# Patient Record
Sex: FEMALE | Race: OTHER | HISPANIC OR LATINO
[De-identification: names, ages, dates, MRNs, and addresses within clinical notes are randomized per-mention and may not be internally consistent; named-entity substitution may affect disease eponyms.]

---

## 2017-03-22 ENCOUNTER — RECORD ABSTRACTING (OUTPATIENT)
Age: 37
End: 2017-03-22

## 2017-03-22 DIAGNOSIS — F17.200 NICOTINE DEPENDENCE, UNSPECIFIED, UNCOMPLICATED: ICD-10-CM

## 2017-03-22 DIAGNOSIS — Z78.9 OTHER SPECIFIED HEALTH STATUS: ICD-10-CM

## 2017-03-22 PROBLEM — Z00.00 ENCOUNTER FOR PREVENTIVE HEALTH EXAMINATION: Status: ACTIVE | Noted: 2017-03-22

## 2018-10-26 ENCOUNTER — INPATIENT (INPATIENT)
Facility: HOSPITAL | Age: 38
LOS: 3 days | Discharge: HOME | End: 2018-10-30
Attending: PSYCHIATRY & NEUROLOGY | Admitting: PSYCHIATRY & NEUROLOGY

## 2018-10-26 VITALS
RESPIRATION RATE: 20 BRPM | DIASTOLIC BLOOD PRESSURE: 75 MMHG | HEART RATE: 98 BPM | TEMPERATURE: 97 F | OXYGEN SATURATION: 99 % | SYSTOLIC BLOOD PRESSURE: 127 MMHG

## 2018-10-26 DIAGNOSIS — F39 UNSPECIFIED MOOD [AFFECTIVE] DISORDER: ICD-10-CM

## 2018-10-26 LAB
ALBUMIN SERPL ELPH-MCNC: 4.5 G/DL — SIGNIFICANT CHANGE UP (ref 3.5–5.2)
ALP SERPL-CCNC: 62 U/L — SIGNIFICANT CHANGE UP (ref 30–115)
ALT FLD-CCNC: 29 U/L — SIGNIFICANT CHANGE UP (ref 0–41)
AMPHET UR-MCNC: NEGATIVE — SIGNIFICANT CHANGE UP
ANION GAP SERPL CALC-SCNC: 19 MMOL/L — HIGH (ref 7–14)
APAP SERPL-MCNC: <5 UG/ML — LOW (ref 10–30)
APPEARANCE UR: ABNORMAL
AST SERPL-CCNC: 24 U/L — SIGNIFICANT CHANGE UP (ref 0–41)
BACTERIA # UR AUTO: ABNORMAL /HPF
BARBITURATES UR SCN-MCNC: NEGATIVE — SIGNIFICANT CHANGE UP
BENZODIAZ UR-MCNC: NEGATIVE — SIGNIFICANT CHANGE UP
BILIRUB SERPL-MCNC: 0.5 MG/DL — SIGNIFICANT CHANGE UP (ref 0.2–1.2)
BILIRUB UR-MCNC: ABNORMAL
BUN SERPL-MCNC: 10 MG/DL — SIGNIFICANT CHANGE UP (ref 10–20)
CALCIUM SERPL-MCNC: 9.5 MG/DL — SIGNIFICANT CHANGE UP (ref 8.5–10.1)
CHLORIDE SERPL-SCNC: 99 MMOL/L — SIGNIFICANT CHANGE UP (ref 98–110)
CO2 SERPL-SCNC: 23 MMOL/L — SIGNIFICANT CHANGE UP (ref 17–32)
COCAINE METAB.OTHER UR-MCNC: POSITIVE
COLOR SPEC: SIGNIFICANT CHANGE UP
COMMENT - URINE: SIGNIFICANT CHANGE UP
CREAT SERPL-MCNC: 0.9 MG/DL — SIGNIFICANT CHANGE UP (ref 0.7–1.5)
DIFF PNL FLD: ABNORMAL
DRUG SCREEN 1, URINE RESULT: SIGNIFICANT CHANGE UP
EPI CELLS # UR: ABNORMAL /HPF
GLUCOSE SERPL-MCNC: 97 MG/DL — SIGNIFICANT CHANGE UP (ref 70–99)
GLUCOSE UR QL: NEGATIVE — SIGNIFICANT CHANGE UP
HCT VFR BLD CALC: 37.8 % — SIGNIFICANT CHANGE UP (ref 37–47)
HGB BLD-MCNC: 13.1 G/DL — SIGNIFICANT CHANGE UP (ref 12–16)
HYALINE CASTS # UR AUTO: ABNORMAL /LPF
KETONES UR-MCNC: 15
LEUKOCYTE ESTERASE UR-ACNC: ABNORMAL
LIDOCAIN IGE QN: 25 U/L — SIGNIFICANT CHANGE UP (ref 7–60)
MCHC RBC-ENTMCNC: 29.8 PG — SIGNIFICANT CHANGE UP (ref 27–31)
MCHC RBC-ENTMCNC: 34.7 G/DL — SIGNIFICANT CHANGE UP (ref 32–37)
MCV RBC AUTO: 85.9 FL — SIGNIFICANT CHANGE UP (ref 81–99)
METHADONE UR-MCNC: NEGATIVE — SIGNIFICANT CHANGE UP
NITRITE UR-MCNC: NEGATIVE — SIGNIFICANT CHANGE UP
NRBC # BLD: 0 /100 WBCS — SIGNIFICANT CHANGE UP (ref 0–0)
OPIATES UR-MCNC: NEGATIVE — SIGNIFICANT CHANGE UP
PCP UR-MCNC: NEGATIVE — SIGNIFICANT CHANGE UP
PH UR: 5.5 — SIGNIFICANT CHANGE UP (ref 5–8)
PLATELET # BLD AUTO: 223 K/UL — SIGNIFICANT CHANGE UP (ref 130–400)
POTASSIUM SERPL-MCNC: 3.3 MMOL/L — LOW (ref 3.5–5)
POTASSIUM SERPL-SCNC: 3.3 MMOL/L — LOW (ref 3.5–5)
PROPOXYPHENE QUALITATIVE URINE RESULT: NEGATIVE — SIGNIFICANT CHANGE UP
PROT SERPL-MCNC: 8.5 G/DL — HIGH (ref 6–8)
PROT UR-MCNC: 30
RBC # BLD: 4.4 M/UL — SIGNIFICANT CHANGE UP (ref 4.2–5.4)
RBC # FLD: 12.8 % — SIGNIFICANT CHANGE UP (ref 11.5–14.5)
RBC CASTS # UR COMP ASSIST: SIGNIFICANT CHANGE UP /HPF
SALICYLATES SERPL-MCNC: <0.3 MG/DL — LOW (ref 4–30)
SODIUM SERPL-SCNC: 141 MMOL/L — SIGNIFICANT CHANGE UP (ref 135–146)
SP GR SPEC: >=1.03 — SIGNIFICANT CHANGE UP (ref 1.01–1.03)
THC UR QL: NEGATIVE — SIGNIFICANT CHANGE UP
UROBILINOGEN FLD QL: 1 (ref 0.2–0.2)
WBC # BLD: 14.04 K/UL — HIGH (ref 4.8–10.8)
WBC # FLD AUTO: 14.04 K/UL — HIGH (ref 4.8–10.8)
WBC UR QL: SIGNIFICANT CHANGE UP /HPF

## 2018-10-26 RX ORDER — SODIUM CHLORIDE 9 MG/ML
1000 INJECTION INTRAMUSCULAR; INTRAVENOUS; SUBCUTANEOUS
Qty: 0 | Refills: 0 | Status: DISCONTINUED | OUTPATIENT
Start: 2018-10-26 | End: 2018-10-30

## 2018-10-26 RX ORDER — TETANUS TOXOID, REDUCED DIPHTHERIA TOXOID AND ACELLULAR PERTUSSIS VACCINE, ADSORBED 5; 2.5; 8; 8; 2.5 [IU]/.5ML; [IU]/.5ML; UG/.5ML; UG/.5ML; UG/.5ML
0.5 SUSPENSION INTRAMUSCULAR ONCE
Qty: 0 | Refills: 0 | Status: COMPLETED | OUTPATIENT
Start: 2018-10-26 | End: 2018-10-26

## 2018-10-26 RX ORDER — POTASSIUM CHLORIDE 20 MEQ
40 PACKET (EA) ORAL ONCE
Qty: 0 | Refills: 0 | Status: COMPLETED | OUTPATIENT
Start: 2018-10-26 | End: 2018-10-26

## 2018-10-26 RX ORDER — OXYCODONE AND ACETAMINOPHEN 5; 325 MG/1; MG/1
1 TABLET ORAL ONCE
Qty: 0 | Refills: 0 | Status: DISCONTINUED | OUTPATIENT
Start: 2018-10-26 | End: 2018-10-26

## 2018-10-26 RX ORDER — DIPHENHYDRAMINE HCL 50 MG
50 CAPSULE ORAL ONCE
Qty: 0 | Refills: 0 | Status: DISCONTINUED | OUTPATIENT
Start: 2018-10-26 | End: 2018-10-29

## 2018-10-26 RX ADMIN — SODIUM CHLORIDE 125 MILLILITER(S): 9 INJECTION INTRAMUSCULAR; INTRAVENOUS; SUBCUTANEOUS at 14:17

## 2018-10-26 RX ADMIN — OXYCODONE AND ACETAMINOPHEN 1 TABLET(S): 5; 325 TABLET ORAL at 14:18

## 2018-10-26 RX ADMIN — OXYCODONE AND ACETAMINOPHEN 1 TABLET(S): 5; 325 TABLET ORAL at 14:17

## 2018-10-26 RX ADMIN — Medication 40 MILLIEQUIVALENT(S): at 17:46

## 2018-10-26 RX ADMIN — TETANUS TOXOID, REDUCED DIPHTHERIA TOXOID AND ACELLULAR PERTUSSIS VACCINE, ADSORBED 0.5 MILLILITER(S): 5; 2.5; 8; 8; 2.5 SUSPENSION INTRAMUSCULAR at 14:17

## 2018-10-26 NOTE — ED PROVIDER NOTE - OBJECTIVE STATEMENT
28 yo F  c/o intermittent left mid back pain  x 3 days associated with nausea. Pain is worse with movement. Patient was seen in [facility] yesterday for allergic reaction but also fell after ED visit.  Pain increased since. No fever, abdominal pains, hematuria or dysuria. 26 yo F  c/o intermittent left mid back pain  x 3 days associated with nausea. Pain is worse with movement. Patient was seen in [facility] yesterday for allergic reaction but also fell after ED visit.  Pain increased since. No fever, abdominal pains, hematuria or dysuria. Patient currently on Bactrim but does not know why. 39yo F  c/o intermittent left mid back pain  x 3 days associated with nausea. Pain is worse with movement. Patient was seen in [facility] yesterday for allergic reaction but also fell after ED visit.  Pain increased since. No fever, abdominal pains, hematuria or dysuria. Patient currently on Bactrim but does not know why.

## 2018-10-26 NOTE — ED BEHAVIORAL HEALTH ASSESSMENT NOTE - DESCRIPTION
Irritable, but in behavioral control. Denied Patient reports employment, but is vague. She reports having 8 children, 2 live with her.  She reports they are all cared for.  She otherwise would not participate in providing a history.

## 2018-10-26 NOTE — ED ADULT NURSE REASSESSMENT NOTE - NS ED NURSE REASSESS COMMENT FT1
pt reassessed A/o times 4 Vs stable 1:1 sit at bed site remain comfortable denies no pain no SOB no N/V no depression or  suicide thought, no homicide thought pt admitted to [facility] report is given to N  waiting for transport .

## 2018-10-26 NOTE — ED PROVIDER NOTE - PHYSICAL EXAMINATION
Gen: Alert, NAD, well appearing  Head: NC, AT, PERRL, EOMI, normal lids/conjunctiva  ENT: normal hearing, patent oropharynx  Neck: +supple, no tenderness/meningismus,  Pulm: Bilateral BS, normal resp effort, no wheeze/stridor/retractions  CV: RRR, no murmer  Abd: soft, +left flank and left CVA tenderness. No abdominal tenderness  Mskel: no edema/erythema/cyanosis. +tender to palpation left mid back/left  CVA. No midline tenderness. SLR negative. Patient ambulatory in ED  Skin: +abrasion to mid back. no rash, warm/dry  Neuro: AAOx3, no sensory/motor deficits

## 2018-10-26 NOTE — ED PROVIDER NOTE - PROGRESS NOTE DETAILS
Patient c/o itchiness to hands. +redness noted to hands and bacK. WIll give Benadryl. Patient requesting to see psych for anxiety Per Dr. Dockery, admit to [facility] under Dr. Moya

## 2018-10-26 NOTE — ED BEHAVIORAL HEALTH ASSESSMENT NOTE - SUMMARY
38 year old woman domiciled with two children, reports that she is employed by [employer] with reported past psychiatric history of schizoaffective d/o and polysubstance disorder by collateral who requested to speak to a psychiatrist.  The patient's psychiatric history is unclear at this point as the patient has not been forthcoming with her history.  It appears that she has a chronic mental illness that has required treatment with antipsychotics and multiple past psychiatric hospitalizations.  AT this time she appears to be in distress and is requesting voluntary admission.  She has modifiable factors that would benefit from inpatient admission especially given that she is currently without outpatient treatment.

## 2018-10-26 NOTE — ED BEHAVIORAL HEALTH ASSESSMENT NOTE - OTHER PAST PSYCHIATRIC HISTORY (INCLUDE DETAILS REGARDING ONSET, COURSE OF ILLNESS, INPATIENT/OUTPATIENT TREATMENT)
The patient was vague and no prior behavioral health notes could be recovered from the chart search.  She has had multiple hospitalizations, probably at [facility].  She has also been treated as an outpatient at [facility].  She was contradictory regarding length of treatment and past medication trials.  She denied past suicide attempts and violence.

## 2018-10-26 NOTE — ED PROVIDER NOTE - ATTENDING CONTRIBUTION TO CARE
26yo woman h/o "anxiety and depression" c/o low back pain,mostly R sided, associated with L back pain x 3 days. Nausea but no vomiting, no urinary sx, no vag complaints, no pelvic pain. On exam, pt has a bizarre, blunted affect, does not make eye contact. RAH, EOMI, lungs CTA, CVs1S2 RRR abd soft, NT, mild L CVA tenderness. Skin noted to be flushed/erythematous, mostly over the torso but also cheeks and hands. Pt reported that she was seen at [email or web address] for allergic rxn yesterday. She also received percocet in the ED today and says she has not taken it before, does not know of any other allergies. During my exam, pt suddenly requested evaluation by psychiatrist because "I'm mentally not well, I cant take care of my babies." She denied SI/HI but said she has been admitted before for psych issues. Otherwise she is a poor historian. Given affect and complaints, labs/CT were done and unremarkable; pt was seen by psych and rec admission. 37yo woman h/o "anxiety and depression" c/o low back pain,mostly R sided, associated with L back pain x 3 days. Nausea but no vomiting, no urinary sx, no vag complaints, no pelvic pain. On exam, pt has a bizarre, blunted affect, does not make eye contact. RAH, EOMI, lungs CTA, CVs1S2 RRR abd soft, NT, mild L CVA tenderness. Skin noted to be flushed/erythematous, mostly over the torso but also cheeks and hands. Pt reported that she was seen at [facility] for allergic rxn yesterday. She also received percocet in the ED today and says she has not taken it before, does not know of any other allergies. During my exam, pt suddenly requested evaluation by psychiatrist because "I'm mentally not well, I cant take care of my babies." She denied SI/HI but said she has been admitted before for psych issues. Otherwise she is a poor historian. Given affect and complaints, labs/CT were done and unremarkable; pt was seen by psych and rec admission.

## 2018-10-26 NOTE — ED BEHAVIORAL HEALTH ASSESSMENT NOTE - RISK ASSESSMENT
Unable to participate in safety planning and a vague history to renders safety assessment limited at this time

## 2018-10-26 NOTE — ED ADULT TRIAGE NOTE - CHIEF COMPLAINT QUOTE
patient reports lower back pain x3 days denies urinary complaints patient. patient seen at [facility] this am for same complaint

## 2018-10-26 NOTE — ED PROVIDER NOTE - MEDICAL DECISION MAKING DETAILS
26yo woman h/o psych d/o ?? c/o back/flank pain, unremarkable workup with labs/CT; seen by psych at her request, pt admitted to [facility] 39yo woman h/o psych d/o ?? c/o back/flank pain, unremarkable workup with labs/CT; seen by psych at her request, pt admitted to [facility]

## 2018-10-26 NOTE — ED ADULT NURSE NOTE - NS TRANSFER PATIENT BELONGINGS
duffle bag with additional belongings, pocketbook (patient state she has no money)/Other belongings/Cell Phone/PDA (specify)/Clothing

## 2018-10-26 NOTE — ED ADULT NURSE NOTE - NSIMPLEMENTINTERV_GEN_ALL_ED
Implemented All Universal Safety Interventions:  [place] to call system. Call bell, personal items and telephone within reach. Instruct patient to call for assistance. Room bathroom lighting operational. Non-slip footwear when patient is off stretcher. Physically safe environment: no spills, clutter or unnecessary equipment. Stretcher in lowest position, wheels locked, appropriate side rails in place.

## 2018-10-26 NOTE — ED BEHAVIORAL HEALTH ASSESSMENT NOTE - SUBSTANCE USE
need for outpatient follow-up/return to ED if symptoms worsen, persist or questions arise/radiology results/lab results None known

## 2018-10-26 NOTE — ED BEHAVIORAL HEALTH ASSESSMENT NOTE - HPI (INCLUDE ILLNESS QUALITY, SEVERITY, DURATION, TIMING, CONTEXT, MODIFYING FACTORS, ASSOCIATED SIGNS AND SYMPTOMS)
38 year old woman domiciled with two children, reports that she is employed by [employer] with reported past psychiatric history of "anxiety" who requested to speak to a psychiatrist.    The patient was a vague, at times confrontational, and unreliable historian.  She was unable to answer questions completely and appeared tired on assessment.  She reported wanting admission to psychiatry because she is overwhelmed.  She reported experiencing dysphoria and generalized anxiety with frequent panic attacks "every 2 hours."  When attempting to discuss triggers, she would only repeat that "my mother messes everything up" stating that she was persecuted by her mother, but could not elaborate further.  She was otherwise not very forthcoming with history though it appears she is out of psychiatric treatment for approximately 6 months and had previously been treated at [facility] outpatient program.  She reported a previous history of treatment with risperidone and denied all other medications.  But when asked again she admitted there were probably many more she could not recall.  She also initially reported one past hospitalization as a child "because my mom is crazy," but then later reported multiple past hospitalizations with the last one being about 6 months ago.  She would not elaborate further on symptoms and past diagnoses though she did reply in the affirmative that she can experience auditory hallucinations.  She denied current SI and HI.  However, she could not safety plan nor could she provide clear answers to what she would do if discharged from the ED with outpatient referrals.    As per [facility] collateral, the patient is diagnosed with schizoaffective disorder, polysubstance use disorder, and borderline personality disorder.  The attending was unable to provide more information regarding the patient's history other than she was last admitted in June 2018 and treated with risperidone 3 mg PO BID

## 2018-10-26 NOTE — ED BEHAVIORAL HEALTH ASSESSMENT NOTE - DETAILS
back pain being treated in the ED reports a rash being treated in the ED with diphenhydramine two children at home both over age 18 none Patient unwilling documentation self referred

## 2018-10-26 NOTE — ED ADULT NURSE NOTE - CHIEF COMPLAINT QUOTE
patient reports lower back pain x3 days denies urinary complaints patient. patient seen at [facility] this am for same complaint

## 2018-10-26 NOTE — ED PROVIDER NOTE - NS ED ROS FT
Review of Systems    Constitutional: (-) fever  Eyes/ENT: (-) blurry vision, (-) epistaxis  Cardiovascular: (-) chest pain, (-) syncope  Respiratory: (-) cough, (-) shortness of breath  Gastrointestinal: (+) nausea, (-) vomiting, (-) diarrhea  Musculoskeletal: (-) neck pain, (+) back pain, (-) joint pain  Integumentary: (-) rash, (-) edema  Neurological: (-) headache, (-) altered mental status  Psychiatric: (-) hallucinations  Allergic/Immunologic: (-) pruritus

## 2018-10-27 LAB
CULTURE RESULTS: SIGNIFICANT CHANGE UP
SPECIMEN SOURCE: SIGNIFICANT CHANGE UP

## 2018-10-27 RX ORDER — ACETAMINOPHEN 500 MG
650 TABLET ORAL EVERY 6 HOURS
Qty: 0 | Refills: 0 | Status: DISCONTINUED | OUTPATIENT
Start: 2018-10-27 | End: 2018-10-30

## 2018-10-27 RX ORDER — RISPERIDONE 4 MG/1
1 TABLET ORAL EVERY 6 HOURS
Qty: 0 | Refills: 0 | Status: DISCONTINUED | OUTPATIENT
Start: 2018-10-27 | End: 2018-10-30

## 2018-10-27 RX ORDER — DIPHENHYDRAMINE HCL 50 MG
25 CAPSULE ORAL EVERY 6 HOURS
Qty: 0 | Refills: 0 | Status: DISCONTINUED | OUTPATIENT
Start: 2018-10-27 | End: 2018-10-28

## 2018-10-27 RX ORDER — HYDROCORTISONE 1 %
1 OINTMENT (GRAM) TOPICAL
Qty: 0 | Refills: 0 | Status: DISCONTINUED | OUTPATIENT
Start: 2018-10-27 | End: 2018-10-30

## 2018-10-27 RX ADMIN — Medication 1 APPLICATION(S): at 20:32

## 2018-10-27 RX ADMIN — Medication 25 MILLIGRAM(S): at 20:32

## 2018-10-27 RX ADMIN — Medication 1 TABLET(S): at 20:32

## 2018-10-27 RX ADMIN — Medication 1 APPLICATION(S): at 09:26

## 2018-10-27 NOTE — H&P ADULT - NSHPPHYSICALEXAM_GEN_ALL_CORE
GENERAL:  39y/o Female NAD, resting comfortably.  HEAD:  Atraumatic, Normocephalic  EYES: EOMI, PERRLA, conjunctiva and sclera clear  NECK: Supple, No JVD, no cervical lymphadenopathy, non-tender  CHEST/LUNG: Clear to auscultation bilaterally; No wheeze, rhonchi, or rales  HEART: Regular rate and rhythm; S1&S2  ABDOMEN: Soft, Nontender, Nondistended x 4 quadrants; Bowel sounds present  EXTREMITIES:   Peripheral Pulses Present, No clubbing, no cyanosis, or no edema, no calf tenderness  PSYCH: AAOx3, cooperative, appropriate  NEUROLOGY: WNL  SKIN: WNL

## 2018-10-27 NOTE — H&P ADULT - HISTORY OF PRESENT ILLNESS
she was unable to answer questions completely and appeared tired on assessment.  She reported wanting admission to psychiatry because she is overwhelmed.  She reported experiencing dysphoria and generalized anxiety with frequent panic attacks "every 2 hours."  When attempting to discuss triggers, she would only repeat that "my mother messes everything up" stating that she was persecuted by her mother, but could not elaborate further.  She was otherwise not very forthcoming with history though it appears she is out of psychiatric treatment for approximately 6 months and had previously been treated at [facility] outpatient program.  She reported a previous history of treatment with risperidone and denied all other medications.  But when asked again she admitted there were probably many more she could not recall.  She also initially reported one past hospitalization as a child "because my mom is crazy," but then later reported multiple past hospitalizations with the last one being about 6 months ago.  She would not elaborate further on symptoms and past diagnoses though she did reply in the affirmative that she can experience auditory hallucinations.  She denied current SI and HI.  However, she could not safety plan nor could she provide clear answers to what she would do if discharged from the ED with outpatient referrals.    As per [facility] collateral, the patient is diagnosed with schizoaffective disorder, polysubstance use disorder, and borderline personality disorder. 37 y/o female unable to  answer questions completely and appeared tired on assessment.  She reported wanting admission to psychiatry because she is overwhelmed.  She reported experiencing dysphoria and generalized anxiety with frequent panic attacks "every 2 hours."  When attempting to discuss triggers, she would only repeat that "my mother messes everything up" stating that she was persecuted by her mother, but could not elaborate further.  She was otherwise not very forthcoming with history though it appears she is out of psychiatric treatment for approximately 6 months and had previously been treated at [facility] outpatient program.  She reported a previous history of treatment with risperidone and denied all other medications.  But when asked again she admitted there were probably many more she could not recall.  She also initially reported one past hospitalization as a child "because my mom is crazy," but then later reported multiple past hospitalizations with the last one being about 6 months ago.  She would not elaborate further on symptoms and past diagnoses though she did reply in the affirmative that she can experience auditory hallucinations.  She denied current SI and HI.  However, she could not safety plan nor could she provide clear answers to what she would do if discharged from the ED with outpatient referrals.    As per [facility] collateral, the patient is diagnosed with schizoaffective disorder, polysubstance use disorder, and borderline personality disorder.

## 2018-10-27 NOTE — H&P ADULT - NSHPLABSRESULTS_GEN_ALL_CORE
13.1   14.04 )-----------( 223      ( 26 Oct 2018 13:40 )             37.8       10-26    141  |  99  |  10  ----------------------------<  97  3.3<L>   |  23  |  0.9    Ca    9.5      26 Oct 2018 13:40    TPro  8.5<H>  /  Alb  4.5  /  TBili  0.5  /  DBili  x   /  AST  24  /  ALT  29  /  AlkPhos  62  10-26              Urinalysis Basic - ( 26 Oct 2018 13:40 )    Color: Dark Yellow / Appearance: Cloudy / SG: >=1.030 / pH: x  Gluc: x / Ketone: 15  / Bili: Moderate / Urobili: 1.0   Blood: x / Protein: 30 / Nitrite: Negative   Leuk Esterase: Trace / RBC: 1-2 /HPF / WBC 1-2 /HPF   Sq Epi: x / Non Sq Epi: Moderate /HPF / Bacteria: Few /HPF            Lactate Trend            CAPILLARY BLOOD GLUCOSE

## 2018-10-27 NOTE — BEHAVIORAL HEALTH ASSESSMENT NOTE - HPI (INCLUDE ILLNESS QUALITY, SEVERITY, DURATION, TIMING, CONTEXT, MODIFYING FACTORS, ASSOCIATED SIGNS AND SYMPTOMS)
Pt. stated she is not taking Risperdal. She will take Bactrium  She denied suicidal or homicidal ideation. She resting in her bed.

## 2018-10-27 NOTE — H&P ADULT - ASSESSMENT
46 y/o female experiencing dysphoria and generalized anxiety with frequent panic attacks "every 2 hours."  When attempting to discuss triggers, she would only repeat that "my mother messes everything up" stating that she was persecuted by her mother, but could not elaborate further.  She was otherwise not very forthcoming with history though it appears she is out of psychiatric treatment for approximately 6 months and had previously been treated at [facility]

## 2018-10-28 DIAGNOSIS — R21 RASH AND OTHER NONSPECIFIC SKIN ERUPTION: ICD-10-CM

## 2018-10-28 RX ORDER — HYDROXYZINE HCL 10 MG
50 TABLET ORAL EVERY 6 HOURS
Qty: 0 | Refills: 0 | Status: DISCONTINUED | OUTPATIENT
Start: 2018-10-28 | End: 2018-10-30

## 2018-10-28 RX ADMIN — Medication 1 TABLET(S): at 08:35

## 2018-10-28 RX ADMIN — Medication 1 APPLICATION(S): at 08:35

## 2018-10-28 RX ADMIN — RISPERIDONE 1 MILLIGRAM(S): 4 TABLET ORAL at 12:50

## 2018-10-28 RX ADMIN — Medication 25 MILLIGRAM(S): at 13:11

## 2018-10-28 RX ADMIN — Medication 1 APPLICATION(S): at 20:33

## 2018-10-28 RX ADMIN — Medication 1 TABLET(S): at 20:33

## 2018-10-28 RX ADMIN — Medication 50 MILLIGRAM(S): at 17:16

## 2018-10-28 NOTE — CONSULT NOTE ADULT - PROBLEM SELECTOR RECOMMENDATION 2
atyopical loacalized rash ledft flank  on hydro corstisone for it   if worsenes sha need derm eval   wbc ntoed  also on basctirm  poosible resiolving uti
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
## 2018-10-29 DIAGNOSIS — F14.90 COCAINE USE, UNSPECIFIED, UNCOMPLICATED: ICD-10-CM

## 2018-10-29 RX ORDER — RISPERIDONE 4 MG/1
1 TABLET ORAL
Qty: 0 | Refills: 0 | Status: DISCONTINUED | OUTPATIENT
Start: 2018-10-29 | End: 2018-10-30

## 2018-10-29 RX ORDER — BENZOCAINE AND MENTHOL 5; 1 G/100ML; G/100ML
1 LIQUID ORAL EVERY 4 HOURS
Qty: 0 | Refills: 0 | Status: DISCONTINUED | OUTPATIENT
Start: 2018-10-29 | End: 2018-10-30

## 2018-10-29 RX ADMIN — RISPERIDONE 1 MILLIGRAM(S): 4 TABLET ORAL at 20:19

## 2018-10-29 RX ADMIN — Medication 1 TABLET(S): at 08:23

## 2018-10-29 RX ADMIN — RISPERIDONE 1 MILLIGRAM(S): 4 TABLET ORAL at 16:19

## 2018-10-29 RX ADMIN — Medication 1 TABLET(S): at 20:19

## 2018-10-29 RX ADMIN — Medication 1 APPLICATION(S): at 08:23

## 2018-10-29 RX ADMIN — Medication 50 MILLIGRAM(S): at 16:08

## 2018-10-29 RX ADMIN — Medication 1 APPLICATION(S): at 20:19

## 2018-10-29 NOTE — CHART NOTE - NSCHARTNOTEFT_GEN_A_CORE
Patient informed psychiatrist that her child Aj Brown was to visit patient at [facility] on Tuesday 30 October. However, due to patient's admission to [facility], patient will be unable to do so. Patient provided contact number for [facility] [employer] ([phone]) to outreach  Sukh Travis (ext. [phone]) that the visit would not be able to happen.  provider contact information of foster care supervisor Holly (ext. [phone]).  informed SW that ACS visit will be rescheduled.

## 2018-10-29 NOTE — PROGRESS NOTE BEHAVIORAL HEALTH - NSBHCHARTREVIEWVS_PSY_A_CORE FT
T(C): 36.1 (10-29-18 @ 08:52), Max: 36.1 (10-29-18 @ 08:52)  HR: 86 (10-29-18 @ 08:52) (86 - 99)  BP: 90/50 (10-29-18 @ 08:52) (90/50 - 137/70)  RR: 17 (10-29-18 @ 08:52) (16 - 17)  SpO2: --

## 2018-10-29 NOTE — PROGRESS NOTE BEHAVIORAL HEALTH - NSBHFUPINTERVALHXFT_PSY_A_CORE
Pt was seen and evaluated in her room currently cooperative and states that she starting to feel less depressed but still with anxiety and believes her mother gets her agitated so she could be hospitalized.  She denies any suicidal or homicidal ideation at present and responsive to staff's verbal redirection.
Pt seen, chart reviewed. No acute events overnight.  Patient is alert, anxious, slightly agitated, cooperative,  Patient is in good behavioral control.  Pt presents no acute management problems.     ***Pt denies and presents no evidence for suicidal or homicidal ideas plans or intentions.    ***Pt slept well, and reports normal appetite and regular bowel movements. Pt was admitted as a voluntary pt an has submitted a 72 hours letter. Will continue to assess pt for ability to care for self and ascertain that she has a realistic discharge plan.

## 2018-10-29 NOTE — PROGRESS NOTE BEHAVIORAL HEALTH - NSBHFUPINTERVALCCFT_PSY_A_CORE
Pt was admitted due to agitation and paranoia.
"I wan t to b e discharged, I have a lot of things to do"
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
## 2018-10-29 NOTE — PROGRESS NOTE ADULT - SUBJECTIVE AND OBJECTIVE BOX
pt stable alert in NAD  no new complaints    MOOD DISORDER; FLANK PAIN  ^MOOD DISORDER; FLANK PAIN  No pertinent family history in first degree relatives  MEWS Score  Mood disorder  Rash in adult  Mood disorder  No significant past surgical history  BACK PAIN  90+  Flank pain    HEALTH ISSUES - PROBLEM Dx:  Rash in adult: Rash in adult  Mood disorder        PAST MEDICAL & SURGICAL HISTORY:  No significant past surgical history    No Known Allergies      FAMILY HISTORY:  No pertinent family history in first degree relatives      acetaminophen   Tablet .. 650 milliGRAM(s) Oral every 6 hours PRN  aluminum hydroxide/magnesium hydroxide/simethicone Suspension 30 milliLiter(s) Oral every 4 hours PRN  diphenhydrAMINE 50 milliGRAM(s) Oral once  hydrocortisone 0.5% Cream 1 Application(s) Topical two times a day  hydrOXYzine hydrochloride 50 milliGRAM(s) Oral every 6 hours PRN  risperiDONE   Tablet 1 milliGRAM(s) Oral every 6 hours PRN  sodium chloride 0.9%. 1000 milliLiter(s) IV Continuous <Continuous>  trimethoprim  160 mG/sulfamethoxazole 800 mG 1 Tablet(s) Oral two times a day      T(C): 35.7 (10-28-18 @ 18:45), Max: 36.4 (10-28-18 @ 10:09)  HR: 99 (10-28-18 @ 18:45) (71 - 99)  BP: 137/70 (10-28-18 @ 18:45) (110/62 - 137/70)  RR: 16 (10-28-18 @ 18:45) (16 - 18)  SpO2: --    PE;  general:   stable no acute changes  Lungs:    Heart:    EXT:    Neuro: no deficits   skon rash left flank unchanged      13.1  37.8  14.04  10  0.9  3.3  97                      CAPILLARY BLOOD GLUCOSE

## 2018-10-29 NOTE — PROGRESS NOTE BEHAVIORAL HEALTH - NSBHADMITIPOBSFT_PSY_A_CORE
Problem: Intellectual/Education/Knowledge Deficit  Intervention: Verbal/written education provided  Discuss port flush    Goal: Teaching initiated upon admission  Outcome: Met This Shift  Verbalized understanding of port flush    Problem: Discharge Planning  Intervention: Discharge to appropriate level of care  Discuss discharge instructions, follow ups and when to call doctor. Goal: Knowledge of discharge instructions  Knowledge of discharge instructions     Outcome: Met This Shift  Verbalized understanding of discharge instructions, follow ups and when to call doctor    Problem: Infection - Central Venous Catheter-Associated Bloodstream Infection:  Intervention: Infection risk assessment  Discuss port maintenance, infection prevention, signs and when to call Dr    Goal: Will show no infection signs and symptoms  Will show no infection signs and symptoms   Outcome: Met This Shift  Mediport site with no redness or warmth. Skin over port intact with no signs of breakdown noted. Patient verbalizes signs/symptoms of port infection and when to notify the physician. Comments: Care plan reviewed with patient. Patient  verbalized understanding of the plan of care and contribute to goal setting.
clinically indicated
clinically indicated

## 2018-10-30 VITALS
TEMPERATURE: 97 F | SYSTOLIC BLOOD PRESSURE: 128 MMHG | DIASTOLIC BLOOD PRESSURE: 58 MMHG | HEART RATE: 88 BPM | RESPIRATION RATE: 18 BRPM

## 2018-10-30 RX ORDER — NICOTINE POLACRILEX 2 MG
1 GUM BUCCAL DAILY
Qty: 0 | Refills: 0 | Status: DISCONTINUED | OUTPATIENT
Start: 2018-10-30 | End: 2018-10-30

## 2018-10-30 RX ORDER — RISPERIDONE 4 MG/1
1 TABLET ORAL
Qty: 0 | Refills: 0 | Status: DISCONTINUED | OUTPATIENT
Start: 2018-10-30 | End: 2018-10-30

## 2018-10-30 RX ORDER — RISPERIDONE 4 MG/1
1 TABLET ORAL
Qty: 60 | Refills: 0 | OUTPATIENT
Start: 2018-10-30 | End: 2018-11-28

## 2018-10-30 RX ADMIN — Medication 1 TABLET(S): at 08:17

## 2018-10-30 RX ADMIN — Medication 1 APPLICATION(S): at 08:17

## 2018-10-30 RX ADMIN — RISPERIDONE 1 MILLIGRAM(S): 4 TABLET ORAL at 08:17

## 2018-10-30 NOTE — DISCHARGE NOTE BEHAVIORAL HEALTH - NSBHDCRESPONSEFT_PSY_A_CORE
Patient responded well to supportive counselling,  medication treatment, individual and group therapy, psychoeducation and medication education. During this stay in the hospital , the patient showed marked improvement. Some hypomanic symptoms such as rapid pressured speech and some irritability persist.

## 2018-10-30 NOTE — CHART NOTE - NSCHARTNOTEFT_GEN_A_CORE
Patient discharging today as per attending. Pt AOx3, denies SI HI and AVH. Pt eager for discharge and expressed a commitment to follow up care. Please see below for detailed social work discharge plan.       SOCIAL WORK:    SOCIAL WORK DISCHARGE PLAN - Residence:  · Residence	home     Aftercare Appointments:  · Agency Name	[facility]  · Appointment Date/Time	05-Nov-2018 08:45  · Address	[address] [place] [address]  · Phone #	[phone]  · Purpose	Evaluation and Referral     Alcohol/Substance Abuse Treatment and Referrals:  · Alcohol/Substance Use Referrals	yes...  · Alcohol/Substance Abuse Treatment Referrals	[facility] [address] [facility]  · Other Referrals	no     Crisis Plan:  1. If I Have The Following Problems:: Suicidal Thoughts  I Should:: Report to the nearest emergency room.     Resources:  · Inpatient Psychiatric Unit - 24/7 phone #	[phone]  · National Suicide Prevention Lifeline	9 [phone]) 645-4776  · Does the patient have a ?	no     Advance Directive:  · Does patient have advance directives (both medical and psychiatric), or a surrogate decision maker?	no...  · Reason	NA     24 Hours Prior to Discharge:  · Caregiver Identified	yes...  · Caregiver Name and Contact Information	friend Humza  [phone]  	  · Caregiver notified of discharge/transfer	yes  · Patient education provided	yes  · Caregiver education provided	yes  · Comments	Faxed to next level of care

## 2018-10-30 NOTE — DISCHARGE NOTE BEHAVIORAL HEALTH - NSBHDCCONDITIONFT_PSY_A_CORE
Patient's condition improved. Upon discharge the patient is in good behavioral control, and presents no acute management problems. Patient denies and presents no evidence for suicidal or homicidal ideas plans or intentions. Patient is not acutely depressed, manic or psychotic. Patient has been sleeping  well, and reports normal appetite and regular bowel movements. Some hypomanic symptoms such as rapid pressured speech and some irritability persist.

## 2018-10-30 NOTE — DISCHARGE NOTE BEHAVIORAL HEALTH - HPI (INCLUDE ILLNESS QUALITY, SEVERITY, DURATION, TIMING, CONTEXT, MODIFYING FACTORS, ASSOCIATED SIGNS AND SYMPTOMS)
Patient reported feeling overwhelmed. "I need help". Pt was recently released from [facility] one week ago Tuesday after spending some four months there. A foundation posted her bond and she was released. Since her release she spent $130 on cocaine and relapsed. She claims she was clean fo the past several years.     Patient grew up c her mom and her brotehr. She ran away from home at age 14 had her first child at age 18. She has 7 dtrs and one son form three fathers.   Her dtr Rivera could not be reached ([phone]), but [facility] confirmed her youngest child (a son 2y/o ) is under their care.     The patient was a vague, at times confrontational, and unreliable historian.  She was unable to answer questions completely and appeared tired on assessment.  She reported wanting admission to psychiatry because she is overwhelmed.  She reported experiencing dysphoria and generalized anxiety with frequent panic attacks "every 2 hours."  When attempting to discuss triggers, she would only repeat that "my mother messes everything up" stating that she was persecuted by her mother, but could not elaborate further.  She was otherwise not very forthcoming with history though it appears she is out of psychiatric treatment for approximately 6 months and had previously been treated at [facility] outpatient program.  She reported a previous history of treatment with risperidone and denied all other medications.  But when asked again she admitted there were probably many more she could not recall.  She also initially reported one past hospitalization as a child "because my mom is crazy," but then later reported multiple past hospitalizations with the last one being about 6 months ago.  She would not elaborate further on symptoms and past diagnoses though she did reply in the affirmative that she can experience auditory hallucinations.  She denied current SI and HI.  However, she could not safety plan nor could she provide clear answers to what she would do if discharged from the ED with outpatient referrals.    As per [facility] collateral, the patient is diagnosed with schizoaffective disorder, polysubstance use disorder, and borderline personality disorder.  The attending was unable to provide more information regarding the patient's history other than she was last admitted in June 2018 and treated with risperidone 3 mg PO BID

## 2018-10-30 NOTE — DISCHARGE NOTE BEHAVIORAL HEALTH - PAST PSYCHIATRIC HISTORY
recurrent admissions and ED visits and tx at [facility]. Poor compliance c aftercare

## 2018-10-30 NOTE — DISCHARGE NOTE BEHAVIORAL HEALTH - MEDICATION SUMMARY - MEDICATIONS TO TAKE
I will START or STAY ON the medications listed below when I get home from the hospital:    risperiDONE 1 mg oral tablet  -- 1 tab(s) by mouth 2 times a day x 30 days   -- Indication: For Mood stabilization

## 2018-10-30 NOTE — DISCHARGE NOTE BEHAVIORAL HEALTH - FAMILY HISTORY OF PSYCHIATRIC ILLNESS
37 y/o S/H/F, undomiciled (stays c friend) unemployed, mother of 8, recently released from correction. Pi si in  touch c her mother and brother and her children. Pt has legal hearings pending.

## 2018-11-06 DIAGNOSIS — Z63.8 OTHER SPECIFIED PROBLEMS RELATED TO PRIMARY SUPPORT GROUP: ICD-10-CM

## 2018-11-06 DIAGNOSIS — R21 RASH AND OTHER NONSPECIFIC SKIN ERUPTION: ICD-10-CM

## 2018-11-06 DIAGNOSIS — Z65.3 PROBLEMS RELATED TO OTHER LEGAL CIRCUMSTANCES: ICD-10-CM

## 2018-11-06 DIAGNOSIS — F17.200 NICOTINE DEPENDENCE, UNSPECIFIED, UNCOMPLICATED: ICD-10-CM

## 2018-11-06 DIAGNOSIS — Z87.440 PERSONAL HISTORY OF URINARY (TRACT) INFECTIONS: ICD-10-CM

## 2018-11-06 DIAGNOSIS — F39 UNSPECIFIED MOOD [AFFECTIVE] DISORDER: ICD-10-CM

## 2018-11-06 DIAGNOSIS — R10.9 UNSPECIFIED ABDOMINAL PAIN: ICD-10-CM

## 2018-11-06 DIAGNOSIS — F25.0 SCHIZOAFFECTIVE DISORDER, BIPOLAR TYPE: ICD-10-CM

## 2018-11-06 DIAGNOSIS — F14.10 COCAINE ABUSE, UNCOMPLICATED: ICD-10-CM

## 2018-11-06 DIAGNOSIS — F19.94 OTHER PSYCHOACTIVE SUBSTANCE USE, UNSPECIFIED WITH PSYCHOACTIVE SUBSTANCE-INDUCED MOOD DISORDER: ICD-10-CM

## 2018-11-06 DIAGNOSIS — Z91.81 HISTORY OF FALLING: ICD-10-CM

## 2018-11-06 DIAGNOSIS — M54.5 LOW BACK PAIN: ICD-10-CM

## 2018-11-06 SDOH — SOCIAL STABILITY - SOCIAL INSECURITY: OTHER SPECIFIED PROBLEMS RELATED TO PRIMARY SUPPORT GROUP: Z63.8

## 2021-07-29 NOTE — PROGRESS NOTE BEHAVIORAL HEALTH - NSBHATTESTSEENBY_PSY_A_CORE
attending Psychiatrist without NP/Trainee
attending Psychiatrist without NP/Trainee
Drysol Counseling:  I discussed with the patient the risks of drysol/aluminum chloride including but not limited to skin rash, itching, irritation, burning.

## 2022-07-19 NOTE — H&P ADULT - PROBLEM/PLAN-1
Show Applicator Variable?: Yes Detail Level: Detailed Consent: The patient's consent was obtained including but not limited to risks of crusting, scabbing, blistering, scarring, darker or lighter pigmentary change, recurrence, incomplete removal and infection. Post-Care Instructions: I reviewed with the patient in detail post-care instructions. Patient is to wear sunprotection, and avoid picking at any of the treated lesions. Pt may apply Vaseline to crusted or scabbing areas. Number Of Freeze-Thaw Cycles: 2 freeze-thaw cycles Render Note In Bullet Format When Appropriate: No Duration Of Freeze Thaw-Cycle (Seconds): 3 Application Tool (Optional): Liquid Nitrogen Sprayer Medical Necessity Information: It is in your best interest to select a reason for this procedure from the list below. All of these items fulfill various CMS LCD requirements except the new and changing color options. Medical Necessity Clause: This procedure was medically necessary because the lesions that were treated were: Spray Paint Text: The liquid nitrogen was applied to the skin utilizing a spray paint frosting technique. Number Of Freeze-Thaw Cycles: 3 freeze-thaw cycles Duration Of Freeze Thaw-Cycle (Seconds): 2 DISPLAY PLAN FREE TEXT

## 2023-07-21 ENCOUNTER — EMERGENCY (EMERGENCY)
Facility: HOSPITAL | Age: 43
LOS: 0 days | Discharge: HOME | End: 2023-07-21
Attending: STUDENT IN AN ORGANIZED HEALTH CARE EDUCATION/TRAINING PROGRAM
Payer: MEDICAID

## 2023-07-21 VITALS
HEIGHT: 61 IN | OXYGEN SATURATION: 97 % | HEART RATE: 86 BPM | SYSTOLIC BLOOD PRESSURE: 153 MMHG | DIASTOLIC BLOOD PRESSURE: 78 MMHG | RESPIRATION RATE: 19 BRPM | WEIGHT: 169.98 LBS | TEMPERATURE: 98 F
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
## 2023-07-21 DIAGNOSIS — S90.822A BLISTER (NONTHERMAL), LEFT FOOT, INITIAL ENCOUNTER: ICD-10-CM

## 2023-07-21 DIAGNOSIS — Z86.59 PERSONAL HISTORY OF OTHER MENTAL AND BEHAVIORAL DISORDERS: ICD-10-CM

## 2023-07-21 DIAGNOSIS — F25.9 SCHIZOAFFECTIVE DISORDER, UNSPECIFIED: ICD-10-CM

## 2023-07-21 DIAGNOSIS — S90.821A BLISTER (NONTHERMAL), RIGHT FOOT, INITIAL ENCOUNTER: ICD-10-CM
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
## 2023-07-21 DIAGNOSIS — F60.3 BORDERLINE PERSONALITY DISORDER: ICD-10-CM

## 2023-07-21 DIAGNOSIS — F41.9 ANXIETY DISORDER, UNSPECIFIED: ICD-10-CM

## 2023-07-21 DIAGNOSIS — F17.200 NICOTINE DEPENDENCE, UNSPECIFIED, UNCOMPLICATED: ICD-10-CM

## 2023-07-21 PROCEDURE — 99053 MED SERV 10PM-8AM 24 HR FAC: CPT

## 2023-07-21 PROCEDURE — 99282 EMERGENCY DEPT VISIT SF MDM: CPT

## 2023-07-21 PROCEDURE — 99284 EMERGENCY DEPT VISIT MOD MDM: CPT

## 2023-07-21 RX ORDER — KETOCONAZOLE 20 MG/G
1 AEROSOL, FOAM TOPICAL
Qty: 1 | Refills: 0
Start: 2023-07-21 | End: 2023-07-27

## 2023-07-21 NOTE — ED PROVIDER NOTE - PROGRESS NOTE DETAILS
ER: pt became verbally abusive using derogatory terms towards clinicians. security called as pt threatening staff. pt to be escorted off the premises.

## 2023-07-21 NOTE — ED PROVIDER NOTE - MDM ORDERS SUBMITTED SELECTION
Anesthetic History No history of anesthetic complications Review of Systems / Medical History Patient summary reviewed, nursing notes reviewed and pertinent labs reviewed Pulmonary Sleep apnea (\"possible\") Smoker Neuro/Psych Within defined limits Cardiovascular Hypertension Dysrhythmias : atrial fibrillation Exercise tolerance: >4 METS Comments: S/P PVI  
GI/Hepatic/Renal 
  
GERD Comments: Epigastric abdominal pain, constipation Endo/Other Diabetes Obesity, morbid obesity and cancer (Skin) Other Findings Physical Exam 
 
Airway Mallampati: II 
TM Distance: 4 - 6 cm Neck ROM: normal range of motion Mouth opening: Normal 
 
 Cardiovascular Regular rate and rhythm,  S1 and S2 normal,  no murmur, click, rub, or gallop Dental 
 
Dentition: Freya Sales Pulmonary Breath sounds clear to auscultation Abdominal 
GI exam deferred Other Findings Anesthetic Plan ASA: 3 Anesthesia type: general and total IV anesthesia Anesthetic plan and risks discussed with: Patient Propofol MAC   
 
 

Not Applicable

## 2023-07-21 NOTE — ED ADULT NURSE NOTE - NSFALLUNIVINTERV_ED_ALL_ED
Physically safe environment - no spills, clutter or unnecessary equipment/Purposeful proactive rounding

## 2023-07-21 NOTE — ED PROVIDER NOTE - NSFOLLOWUPCLINICS_GEN_ALL_ED_FT
[facility]  Podiatry  .  [place]   Phone: [phone]  Fax:   Follow Up Time: 4-6 Days

## 2023-07-21 NOTE — ED PROVIDER NOTE - NS ED ATTENDING STATEMENT MOD
This was a shared visit with the ZHANNA. I reviewed and verified the documentation and independently performed the documented:

## 2023-07-21 NOTE — ED PROVIDER NOTE - NSFOLLOWUPINSTRUCTIONS_ED_ALL_ED_FT
Blisters, Adult  A blister is a raised bubble of skin filled with liquid. Blisters often develop in an area of the skin that repeatedly rubs or presses against another surface (friction blister). Friction blisters can occur on any part of the body, but they usually develop on the hands or feet. Long-term pressure on the same area of the skin can also lead to areas of hardened skin (calluses).    What are the causes?  A blister can be caused by:    An injury.  A burn.  An allergic reaction.  An infection.  Exposure to irritating chemicals.  Friction, especially in an area with a lot of heat and moisture.    Friction blisters often result from:    Sports.  Repetitive activities.  Using tools and doing other activities without wearing gloves.  Shoes that are too tight or too loose.    What are the signs or symptoms?  A blister is often round and looks like a bump. It may:    Itch.  Be painful to the touch.    Before a blister forms, the skin may:    Become red.  Feel warm.  Itch.  Be painful to the touch.    How is this diagnosed?  A blister is diagnosed with a physical exam.    How is this treated?  Treatment usually involves protecting the area where the blister has formed until the skin has healed. Other treatments may include:    A bandage (dressing) to cover the blister.  Extra padding around and over the blister, so that it does not rub on anything.  Antibiotic ointment.    Most blisters break open, dry up, and go away on their own within 1–2 weeks. Blisters that are very painful may be drained before they break open on their own. If the blister is large or painful, it can be drained by:    Sterilizing a small needle with rubbing alcohol.    Washing your hands with soap and water.    Inserting the needle in the edge of the blister to make a small hole. Some fluid will drain out of the hole. Let the top or roof of the blister stay in place. This helps the skin heal.    Washing the blister with mild soap and water.    Covering the blister with antibiotic ointment, if prescribed by your health care provider, and a dressing.      Some blisters may need to be drained by a health care provider.    Follow these instructions at home:  Protect the area where the blister has formed as told by your health care provider.  Keep your blister clean and dry. This helps to prevent infection.  Do not pop your blister. This can cause infection.  ImageIf you were prescribed an antibiotic, use it as told by your health care provider. Do not stop using the antibiotic even if your condition improves.  Wear different shoes until the blister heals.  Avoid the activity that caused the blister until your blister heals.  Check your blister every day for signs of infection. Check for:    More redness, swelling, or pain.  More fluid or blood.  Warmth.  Pus or a bad smell.  The blister getting better and then getting worse.    How is this prevented?  Taking these steps can help to prevent blisters that are caused by friction. Make sure you:    Wear comfortable shoes that fit well.  Always wear socks with shoes.  Wear extra socks or use tape, bandages, or pads over blister-prone areas as needed. You may also apply petroleum jelly under bandages in blister-prone areas.  Wear protective gear, such as gloves, when participating in sports or activities that can cause blisters.  Wear loose-fitting, moisture-wicking clothes when participating in sports or activities.  Use powders as needed to keep your feet dry.    Contact a health care provider if:  You have more redness, swelling, or pain around your blister.  You have more fluid or blood coming from your blister.  Your blister feels warm to the touch.  You have pus or a bad smell coming from your blister.  You have a fever or chills.  Your blister gets better and then it gets worse.  This information is not intended to replace advice given to you by your health care provider. Make sure you discuss any questions you have with your health care provider.

## 2023-07-21 NOTE — ED PROVIDER NOTE - CLINICAL SUMMARY MEDICAL DECISION MAKING FREE TEXT BOX
42 yr old f that presents with blisters. pt became verbally abusive. pt threatening staff, as she feels "Google told me I have an infection". pt explained multiple times that at this point there are no signs of any bacterial infection. pt using profanity and physically threatening language. security called. offered to dress wound, however, pt refusing. Patient's records (prior hospital, ED visit, and/or nursing home notes if available) were reviewed.  Additional history was obtained from EMS, family, and/or PCP (where available).  Escalation to admission/observation was considered.   However patient feels much better and is comfortable with discharge.  Appropriate follow-up was arranged.    I have discussed the discharge plan with the patient. The patient agrees with the plan, as discussed.  The patient understands Emergency Department diagnosis is a preliminary diagnosis often based on limited information and that the patient must adhere to the follow-up plan as discussed.  The patient understands that if the symptoms worsen or if prescribed medications do not have the desired/planned effect that the patient may return to the Emergency Department at any time for further evaluation and treatment.

## 2023-07-21 NOTE — ED PROVIDER NOTE - PHYSICAL EXAMINATION
Constitutional: Well developed, well nourished. NAD  Head: Normocephalic, atraumatic.  Eyes: PERRL, EOMI.  ENT: No nasal discharge. Mucous membranes dry.  Neck: Supple. Painless ROM.  Cardiovascular: Regular rate and rhythm.    Pulmonary:  Lungs clear to auscultation bilaterally.    Abdominal: Soft. Nondistended. No rebound, guarding, rigidity.  Extremities. Pelvis stable. ~2 cm blisters noted to bilat feet laterally without redness, swelling, warmth or discharge; no lymphangitis;   Skin: No rashes, cyanosis.  Neuro: AAOx3. No focal neurological deficits.  Psych: Normal mood. Normal affect.

## 2023-07-21 NOTE — ED PROVIDER NOTE - PATIENT PORTAL LINK FT
You can access the FollowMyHealth Patient Portal offered by [facility] by registering at the following website: [email or web address]. By joining [employer]’s FollowMyHealth portal, you will also be able to view your health information using other applications (apps) compatible with our system.

## 2023-07-21 NOTE — ED PROVIDER NOTE - NSICDXPASTMEDICALHX_GEN_ALL_CORE_FT
PAST MEDICAL HISTORY:  Borderline personality disorder     Polysubstance abuse     Schizoaffective disorder

## 2023-07-21 NOTE — ED PROVIDER NOTE - NS ED ROS FT
Constitutional: No fever, chills.  Eyes: No visual changes.  ENT: No hearing changes.  Neck: No neck pain or stiffness.  Cardiovascular: No chest pain, palpitations, edema.  Pulmonary: No SOB, cough. No hemoptysis.  Abdominal:  No nausea, vomiting, diarrhea.  : No dysuria, frequency.  Neuro: No headache, syncope, dizziness.  MS: + bilat calcaneal blisters x 3 days;  Psych: No suicidal ideations.

## 2023-07-21 NOTE — ED ADULT NURSE NOTE - OBJECTIVE STATEMENT
Patient has had blisters on the back of both feet for two days.  Patient is also uncooperative and insisting that she is not getting proper care, but has no other medical complaints.

## 2023-07-21 NOTE — ED PROVIDER NOTE - NS ED MD DISPO DISCHARGE CCDA
History  Chief Complaint   Patient presents with    Back Pain     Pt with lower back pain for the past week since picking up an item while bending over  Patient is a 50-year-old female presenting to the emergency department with bilateral lower back pain that began on 12/23 after she bent over to  a box  Patient reports feeling a tightness in the lower back at the time of lifting the pox, since then has had this pain  She denies any trauma or injury/fall  She denies any numbness/tingling of the lower extremities  Denies any weakness  Reports no saddle anesthesia  She denies any incontinence or retention of bowel or bladder  She denies any fevers or chills  Reports no history of IV drug abuse  Denies any history of immuno compromising conditions  She otherwise reports no other symptoms denies any other concerns  Prior to Admission Medications   Prescriptions Last Dose Informant Patient Reported? Taking?    VALTREX 1 g tablet   No No   Sig: Take 1 tablet (1,000 mg total) by mouth daily for 30 days   diclofenac sodium (VOLTAREN) 1 %   Yes No   Sig: apply (2G)  by topical route 3 times every day to the left knee   ergocalciferol (VITAMIN D2) 50,000 units   No No   Sig: TAKE 1 CAPSULE BY MOUTH EVERY WEEK   famotidine (PEPCID) 40 MG tablet   No No   Sig: Take 1 tablet (40 mg total) by mouth daily   ibuprofen (MOTRIN) 800 mg tablet   No Yes   Sig: Take 1 tablet (800 mg total) by mouth every 8 (eight) hours as needed for mild pain      Facility-Administered Medications: None       Past Medical History:   Diagnosis Date    Abnormal mammogram of right breast 08/2018    f/u 6 mos    Herpes     Submucous leiomyoma of uterus 9/19/2018       Past Surgical History:   Procedure Laterality Date    BREAST SURGERY Bilateral 2012    breast lift    COLONOSCOPY W/ POLYPECTOMY  08/23/2018    repeat 5 yrs    DILATION AND CURETTAGE OF UTERUS      DC HYSTEROSCOPY,W/ENDO BX N/A 10/11/2018    Procedure: D&C, HYSTEROSCOPY;  Surgeon: Meenakshi Darling MD;  Location: [facility];  Service: Gynecology       Family History   Problem Relation Age of Onset    No Known Problems Mother     No Known Problems Father      I have reviewed and agree with the history as documented  Social History   Substance Use Topics    Smoking status: Never Smoker    Smokeless tobacco: Never Used    Alcohol use No        Review of Systems   Constitutional: Negative for fever  HENT: Negative  Respiratory: Negative for shortness of breath  Cardiovascular: Negative for chest pain  Gastrointestinal: Negative for abdominal pain, diarrhea, nausea and vomiting  Genitourinary: Negative  Musculoskeletal: Positive for back pain  Negative for joint swelling, neck pain and neck stiffness  Skin: Negative for rash  Allergic/Immunologic: Negative for immunocompromised state  Neurological: Negative for dizziness, weakness, light-headedness, numbness and headaches  Hematological: Negative for adenopathy  Physical Exam  Physical Exam   Constitutional: She is oriented to person, place, and time  Vital signs are normal  She appears well-developed and well-nourished  Non-toxic appearance  She does not have a sickly appearance  She does not appear ill  No distress  Eyes: Conjunctivae are normal    Neck: Normal range of motion  Neck supple  Cardiovascular: Normal rate and regular rhythm  Pulmonary/Chest: Effort normal  No respiratory distress  Abdominal: Soft  She exhibits no distension  There is no tenderness  There is no guarding  Musculoskeletal:        Cervical back: Normal         Thoracic back: Normal         Lumbar back: She exhibits decreased range of motion, tenderness and spasm  She exhibits no bony tenderness, no swelling, no edema, no deformity and no laceration  Back:    Neurological: She is alert and oriented to person, place, and time  She has normal strength  No sensory deficit   GCS eye subscore is 4  GCS verbal subscore is 5  GCS motor subscore is 6  Reflex Scores:       Patellar reflexes are 2+ on the right side and 2+ on the left side  Achilles reflexes are 1+ on the right side and 1+ on the left side  Patient has 5/5 strength in lower extremities, bilaterally  Able to dorsiflex and plantar flex the foot against resistance without difficulty  Able to dorsiflex the great toes bilaterally against resistance without any difficulty  Motor function and sensation is intact  Skin: Skin is warm and dry  No rash noted  Psychiatric: She has a normal mood and affect  Nursing note and vitals reviewed        Vital Signs  ED Triage Vitals [12/29/18 1410]   Temperature Pulse Respirations Blood Pressure SpO2   98 3 °F (36 8 °C) 93 16 128/75 100 %      Temp Source Heart Rate Source Patient Position - Orthostatic VS BP Location FiO2 (%)   Temporal -- Standing Right arm --      Pain Score       Worst Possible Pain           Vitals:    12/29/18 1410   BP: 128/75   Pulse: 93   Patient Position - Orthostatic VS: Standing       Visual Acuity      ED Medications  Medications   ketorolac (TORADOL) injection 15 mg (15 mg Intramuscular Given 12/29/18 1600)   acetaminophen (TYLENOL) tablet 650 mg (650 mg Oral Given 12/29/18 1601)   methocarbamol (ROBAXIN) tablet 750 mg (750 mg Oral Given 12/29/18 1601)       Diagnostic Studies  Results Reviewed     Procedure Component Value Units Date/Time    Urine Microscopic [[id number]]  (Abnormal) Collected:  12/29/18 1616    Lab Status:  Final result Specimen:  Urine from Urine, Clean Catch Updated:  12/29/18 1627     RBC, UA 4-10 (A) /hpf      WBC, UA 0-1 (A) /hpf      Epithelial Cells Occasional /hpf      Bacteria, UA Occasional /hpf     POCT urinalysis dipstick [[id number]]  (Normal) Resulted:  12/29/18 1601    Lab Status:  Final result Specimen:  Urine Updated:  12/29/18 1602     Color, UA YELLOW     Clarity, UA CLEAR    POCT pregnancy, urine [[id number]]  (Normal) Resulted: 12/29/18 1600    Lab Status:  Final result Updated:  12/29/18 1602     EXT PREG TEST UR (Ref: Negative) NEGATIVE    ED Urine Macroscopic [[id number]]  (Abnormal) Collected:  12/29/18 1616    Lab Status:  Final result Specimen:  Urine Updated:  12/29/18 1601     Color, UA Yellow     Clarity, UA Clear     pH, UA 8 5 (H)     Leukocytes, UA Negative     Nitrite, UA Negative     Protein, UA Negative mg/dl      Glucose, UA Negative mg/dl      Ketones, UA Negative mg/dl      Urobilinogen, UA 0 2 E U /dl      Bilirubin, UA Negative     Blood, UA Trace (A)     Specific [place], UA 1 020    Narrative:       CLINITEK RESULT                 No orders to display              Procedures  Procedures       Phone Contacts  ED Phone Contact    ED Course                               MDM  Number of Diagnoses or Management Options  Lumbar strain, initial encounter: new and requires workup  Diagnosis management comments: Exam and history consistent with lumbar strain  Patient responded well with medications given while in the emergency department  Urinalysis shows no concerning abnormalities  Patient will be discharged with prescriptions for diclofenac, Tylenol, and Robaxin  She is instructed to follow up with her primary care provider within the week for reexamination  Instructed to return to the ED with any worsening symptoms emergent concerns         Amount and/or Complexity of Data Reviewed  Clinical lab tests: ordered and reviewed    Patient Progress  Patient progress: stable    CritCare Time    Disposition  Final diagnoses:   Lumbar strain, initial encounter     Time reflects when diagnosis was documented in both MDM as applicable and the Disposition within this note     Time User Action Codes Description Comment    12/29/2018  5:13 PM Lucy Blake Add [S39 [id number]] Lumbar strain, initial encounter       ED Disposition     ED Disposition Condition Comment    Discharge  Regina Keith discharge to home/self care     Condition at discharge: Stable        Follow-up Information     Follow up With Specialties Details Why Contact Info Additional Information    Ray Silva MD Family Medicine In 1 week Within the week for recheck, as discussed  [address]  [address] Emergency Department Emergency Medicine Go to As needed, If symptoms worsen [address] [facility]  [phone] [address] [facility] ED, [address] [facility] , [place], [place], [address]          Discharge Medication List as of 12/29/2018  5:16 PM      START taking these medications    Details   acetaminophen (TYLENOL) 650 mg CR tablet Take 1 tablet (650 mg total) by mouth every 8 (eight) hours as needed for mild pain or moderate pain for up to 7 days, Starting Sat 12/29/2018, Until Sat 1/5/2019, Print      diclofenac sodium (VOLTAREN) 50 mg EC tablet Take 1 tablet (50 mg total) by mouth 3 (three) times a day for 7 days, Starting Sat 12/29/2018, Until Sat 1/5/2019, Print      methocarbamol (ROBAXIN) 750 mg tablet Take 1 tablet (750 mg total) by mouth 3 (three) times a day for 7 days, Starting Sat 12/29/2018, Until Sat 1/5/2019, Print         CONTINUE these medications which have NOT CHANGED    Details   ibuprofen (MOTRIN) 800 mg tablet Take 1 tablet (800 mg total) by mouth every 8 (eight) hours as needed for mild pain, Starting Thu 10/11/2018, Normal      diclofenac sodium (VOLTAREN) 1 % apply (2G)  by topical route 3 times every day to the left knee, Historical Med      ergocalciferol (VITAMIN D2) 50,000 units TAKE 1 CAPSULE BY MOUTH EVERY WEEK, Normal      famotidine (PEPCID) 40 MG tablet Take 1 tablet (40 mg total) by mouth daily, Starting Tue 10/2/2018, Normal      VALTREX 1 g tablet Take 1 tablet (1,000 mg total) by mouth daily for 30 days, Starting Thu 11/29/2018, Until Sat 12/29/2018, Print           No discharge procedures on file      ED Provider  Electronically Signed by           HEAVEN Aguilar  12/29/18 4870 Patient/Caregiver provided printed discharge information.

## 2023-07-21 NOTE — ED PROVIDER NOTE - OBJECTIVE STATEMENT
42 yold female to Ed Pmhx substance abuse(crack/cocaine), schizoaffective disorder, borderline personality disorder; pt present to ed c/o blisters to both posterior foot/heel area x 2-3 days after wearing ill fitting shoes; pt denies fever, chills, n/v, knee or ankle pain;

## 2023-07-21 NOTE — ED PROVIDER NOTE - ATTENDING APP SHARED VISIT CONTRIBUTION OF CARE
42 yr old f w/ a pmh significant for anxiety who presents with blisters. Pt states that she has been using new shoes to walk and has developed blisters. Pt denies any fevers, chills, nausea, vomiting, numbness, tingling or any trauma. Of note, pt went to [facility] prior to this visit where she was discharged with pcp follow up.     VITAL SIGNS: I have reviewed nursing notes and confirm.  CONSTITUTIONAL: non-toxic, well appearing  SKIN: no rash, no petechiae.  EYES: EOMI, pink conjunctiva, anicteric  ENT: tongue midline, no exudates, MMM  NECK: Supple; no meningismus, no JVD  EXT: Normal ROM x4. No edema. No calves tenderness. lower extremity: there are blisters noted to the ankles, no deformities, sensation intact, no crepitus/redness/discharge. dp +2 bilaterally   NEURO: Alert, oriented x3.     42 yr old f that presents with blisters. pt became verbally abusive. pt threatening staff, as she feels "[employer] told me I have an infection". pt explained multiple times that at this point there are no signs of any bacterial infection. pt using profanity and physically threatening language. security called. 42 yr old f w/ a pmh significant for anxiety who presents with blisters. Pt states that she has been using new shoes to walk and has developed blisters. Pt denies any fevers, chills, nausea, vomiting, numbness, tingling or any trauma. Of note, pt went to [facility] prior to this visit where she was discharged with pcp follow up.     VITAL SIGNS: I have reviewed nursing notes and confirm.  CONSTITUTIONAL: non-toxic, well appearing  SKIN: no rash, no petechiae.  EYES: EOMI, pink conjunctiva, anicteric  ENT: tongue midline, no exudates, MMM  NECK: Supple; no meningismus, no JVD  EXT: Normal ROM x4. No edema. No calves tenderness. lower extremity: there are blisters noted to the ankles, no deformities, sensation intact, no crepitus/redness/discharge. dp +2 bilaterally   NEURO: Alert, oriented x3.     42 yr old f that presents with blisters. pt became verbally abusive. pt threatening staff, as she feels "[employer] told me I have an infection". pt explained multiple times that at this point there are no signs of any bacterial infection. pt using profanity and physically threatening language. security called. offered to dress wound, however, pt refusing.

## 2023-07-25 ENCOUNTER — EMERGENCY (EMERGENCY)
Facility: HOSPITAL | Age: 43
LOS: 0 days | Discharge: HOME | End: 2023-07-25
Attending: EMERGENCY MEDICINE
Payer: MEDICAID

## 2023-07-25 VITALS
HEART RATE: 65 BPM | HEIGHT: 61 IN | SYSTOLIC BLOOD PRESSURE: 181 MMHG | DIASTOLIC BLOOD PRESSURE: 84 MMHG | WEIGHT: 169.98 LBS | RESPIRATION RATE: 18 BRPM | OXYGEN SATURATION: 99 % | TEMPERATURE: 98 F

## 2023-07-25 VITALS
SYSTOLIC BLOOD PRESSURE: 61 MMHG | RESPIRATION RATE: 18 BRPM | HEIGHT: 61 IN | TEMPERATURE: 98 F | OXYGEN SATURATION: 97 % | DIASTOLIC BLOOD PRESSURE: 18 MMHG | HEART RATE: 61 BPM

## 2023-07-25 VITALS — HEART RATE: 59 BPM | SYSTOLIC BLOOD PRESSURE: 147 MMHG | DIASTOLIC BLOOD PRESSURE: 71 MMHG
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
## 2023-07-25 DIAGNOSIS — Z86.59 PERSONAL HISTORY OF OTHER MENTAL AND BEHAVIORAL DISORDERS: ICD-10-CM

## 2023-07-25 DIAGNOSIS — F17.200 NICOTINE DEPENDENCE, UNSPECIFIED, UNCOMPLICATED: ICD-10-CM
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
## 2023-07-25 DIAGNOSIS — M79.672 PAIN IN LEFT FOOT: ICD-10-CM

## 2023-07-25 DIAGNOSIS — M79.671 PAIN IN RIGHT FOOT: ICD-10-CM

## 2023-07-25 DIAGNOSIS — M79.605 PAIN IN LEFT LEG: ICD-10-CM

## 2023-07-25 DIAGNOSIS — Z87.898 PERSONAL HISTORY OF OTHER SPECIFIED CONDITIONS: ICD-10-CM

## 2023-07-25 DIAGNOSIS — F25.9 SCHIZOAFFECTIVE DISORDER, UNSPECIFIED: ICD-10-CM

## 2023-07-25 DIAGNOSIS — S90.822A BLISTER (NONTHERMAL), LEFT FOOT, INITIAL ENCOUNTER: ICD-10-CM

## 2023-07-25 DIAGNOSIS — M79.604 PAIN IN RIGHT LEG: ICD-10-CM

## 2023-07-25 DIAGNOSIS — S90.821A BLISTER (NONTHERMAL), RIGHT FOOT, INITIAL ENCOUNTER: ICD-10-CM

## 2023-07-25 DIAGNOSIS — T73.0XXA STARVATION, INITIAL ENCOUNTER: ICD-10-CM
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
## 2023-07-25 PROCEDURE — 99282 EMERGENCY DEPT VISIT SF MDM: CPT

## 2023-07-25 PROCEDURE — 99283 EMERGENCY DEPT VISIT LOW MDM: CPT

## 2023-07-25 RX ORDER — ACETAMINOPHEN 500 MG
650 TABLET ORAL ONCE
Refills: 0 | Status: COMPLETED | OUTPATIENT
Start: 2023-07-25 | End: 2023-07-25

## 2023-07-25 RX ADMIN — Medication 650 MILLIGRAM(S): at 21:44

## 2023-07-25 NOTE — ED PROVIDER NOTE - NSFOLLOWUPINSTRUCTIONS_ED_ALL_ED_FT
Foot Pain    Many things can cause foot pain. Some common causes are:  An injury.  A sprain.  Arthritis.  Blisters.  Bunions.  Follow these instructions at home:  Managing pain, stiffness, and swelling    Bag of ice on a towel on the skin.  If directed, put ice on the painful area:  Put ice in a plastic bag.  Place a towel between your skin and the bag.  Leave the ice on for 20 minutes, 2–3 times a day.  Activity    Do not stand or walk for long periods.  Return to your normal activities as told by your health care provider. Ask your health care provider what activities are safe for you.  Do stretches to relieve foot pain and stiffness as told by your health care provider.  Do not lift anything that is heavier than 10 lb (4.5 kg), or the limit that you are told, until your health care provider says that it is safe. Lifting a lot of weight can put added pressure on your feet.  Lifestyle    Wear comfortable, supportive shoes that fit you well. Do not wear high heels.  Keep your feet clean and dry.  General instructions    Take over-the-counter and prescription medicines only as told by your health care provider.  Rub your foot gently.  Pay attention to any changes in your symptoms.  Keep all follow-up visits as told by your health care provider. This is important.  Contact a health care provider if:  Your pain does not get better after a few days of self-care.  Your pain gets worse.  You cannot stand on your foot.  Get help right away if:  Your foot is numb or tingling.  Your foot or toes are swollen.  Your foot or toes turn white or blue.  You have warmth and redness along your foot.  Summary  Common causes of foot pain are injury, sprain, arthritis, blisters, or bunions.  Ice, medicines, and comfortable shoes may help foot pain.  Contact your health care provider if your pain does not get better after a few days of self-care.  This information is not intended to replace advice given to you by your health care provider. Make sure you discuss any questions you have with your health care provider.

## 2023-07-25 NOTE — ED PROVIDER NOTE - CLINICAL SUMMARY MEDICAL DECISION MAKING FREE TEXT BOX
This patient presents with foot pain and blisters, no erythema, warmth, swelling, not concerning for cellulitis. No sensitivity/pain to light touch around the erythematous area. No lymphangitic spread visible and no fluid pockets or fluctuance concerning for abscess noted. No immune compromise, bullae, pain out of proportion, or rapid progression concerning for necrotizing fasciitis. Patient to be discharged home with follow up with podiatry. Return precautions discussed.

## 2023-07-25 NOTE — ED PROVIDER NOTE - PATIENT PORTAL LINK FT
You can access the FollowMyHealth Patient Portal offered by [facility] by registering at the following website: [email or web address]. By joining [employer]’s FollowMyHealth portal, you will also be able to view your health information using other applications (apps) compatible with our system.

## 2023-07-25 NOTE — ED PROVIDER NOTE - NSFOLLOWUPINSTRUCTIONS_ED_ALL_ED_FT
Musculoskeletal Pain    Musculoskeletal pain is muscle and opal aches and pains. These pains can occur in any part of the body. Your caregiver may treat you without knowing the cause of the pain. They may treat you if blood or urine tests, X-rays, and other tests were normal.     CAUSES  There is often not a definite cause or reason for these pains. These pains may be caused by a type of germ (virus). The discomfort may also come from overuse. Overuse includes working out too hard when your body is not fit. Opal aches also come from weather changes. Bone is sensitive to atmospheric pressure changes.    HOME CARE INSTRUCTIONS  Ask when your test results will be ready. Make sure you get your test results.  Only take over-the-counter or prescription medicines for pain, discomfort, or fever as directed by your caregiver. If you were given medications for your condition, do not drive, operate machinery or power tools, or sign legal documents for 24 hours. Do not drink alcohol. Do not take sleeping pills or other medications that may interfere with treatment.  Continue all activities unless the activities cause more pain. When the pain lessens, slowly resume normal activities. Gradually increase the intensity and duration of the activities or exercise.   During periods of severe pain, bed rest may be helpful. Lay or sit in any position that is comfortable.   Putting ice on the injured area.  Put ice in a bag.   Place a towel between your skin and the bag.  Leave the ice on for 15 to 20 minutes, 3 to 4 times a day.   Follow up with your caregiver for continued problems and no reason can be found for the pain. If the pain becomes worse or does not go away, it may be necessary to repeat tests or do additional testing. Your caregiver may need to look further for a possible cause.    SEEK IMMEDIATE MEDICAL CARE IF:  You have pain that is getting worse and is not relieved by medications.  You develop chest pain that is associated with shortness or breath, sweating, feeling sick to your stomach (nauseous), or throw up (vomit).  Your pain becomes localized to the abdomen.  You develop any new symptoms that seem different or that concern you.    MAKE SURE YOU:  Understand these instructions.   Will watch your condition.  Will get help right away if you are not doing well or get worse.    ADDITIONAL NOTES AND INSTRUCTIONS    Please follow up with your Primary MD in 24-48 hr.  Seek immediate medical care for any new/worsening signs or symptoms.

## 2023-07-25 NOTE — ED PROVIDER NOTE - CLINICAL SUMMARY MEDICAL DECISION MAKING FREE TEXT BOX
No signs of decompensated mental illness, SI or HI. No indication for emergent psych eval.    No signs of DVT, no signs of fracture, dislocation, cellulitis.    Patient received food, analgesia. Stable for dc home.

## 2023-07-25 NOTE — ED PROVIDER NOTE - OBJECTIVE STATEMENT
Patient is a 42 year old female with PMH of ALEX, Schizoaffective Disorder presenting for foot pain.  Patient is endorsing bilateral foot pain and blisters from walking. Patient denies falling, calf tenderness, or additional complaints.

## 2023-07-25 NOTE — ED ADULT NURSE NOTE - NSFALLHARMRISKINTERV_ED_ALL_ED

## 2023-07-25 NOTE — ED PROVIDER NOTE - PHYSICAL EXAMINATION
VITAL SIGNS: I have reviewed nursing notes and confirm.  CONSTITUTIONAL: Well-developed; well-nourished; in no acute distress.  SKIN: Skin exam is warm and dry, no acute rash, no bruising, blistering, skin changes  HEAD: Normocephalic; atraumatic.  EYES: PERRL, EOM intact; conjunctiva and sclera clear.  ENT: No nasal discharge; airway clear.   NECK: Supple; non tender.  CARD: S1, S2 normal; no murmurs, gallops, or rubs. Regular rate and rhythm.  RESP: No wheezes, rales or rhonchi.  ABD: Normal bowel sounds; soft; non-distended; non-tender  EXT: Normal ROM, no swelling, deformity.   NEURO: Alert, oriented. Grossly unremarkable. No focal deficits.  PSYCH: tearful, but consolable, no SI or HI, no apparent hallucinations

## 2023-07-25 NOTE — ED PROVIDER NOTE - ATTENDING CONTRIBUTION TO CARE
I have reviewed and agree with the mid-level note, except as documented in my note below.    42-year-old female history of schizoaffective disorder, polysubstance abuse, now presents for bilateral foot pain secondary to blisters from walking, denies fever, tactile temp, chills, paresthesias, focal weakness, or other associated complaints at present. Pt denies recent heavy lifting or trauma. Old chart reviewed. I have reviewed and agree with the initial nursing note, except as documented in my note.    VSS, awake, alert, no skin lacerations or abrasions. BL LE; no hip, knee (including prox tib-fib) tenderness, ankle/foot; appears symmetrical, no gross deformity, crepitus, swelling or tenderness, blisters noted to BL heels, ROM intact, no joint laxity appreciated, motor and sensation intact distally, NV intact with 2+ DP pulses. There is no pain on palpation of the achilles tendon. No warmth, erythema, induration, fluctuance, lymphangitis or purulence. Able to ambulate in ED w/o difficulty.

## 2023-07-25 NOTE — ED PROVIDER NOTE - NSFOLLOWUPCLINICS_GEN_ALL_ED_FT
[facility]  Podiatry  .  [place]   Phone: [phone]  Fax:   Follow Up Time: Routine

## 2023-07-25 NOTE — ED PROVIDER NOTE - OBJECTIVE STATEMENT
41 yo F, hx of substance use disorder, schizoaffective disorder, borderline personality disorder here for assessment. Patient states she has no money for food and has to walk everywhere so she has pain in her feet. Patient is tearful but denies SI and HI.

## 2023-07-25 NOTE — ED PROVIDER NOTE - PATIENT PORTAL LINK FT
You can access the FollowMyHealth Patient Portal offered by [facility] by registering at the following website: [email or web address]. By joining [employer]’s FollowMyHealth portal, you will also be able to view your health information using other applications (apps) compatible with our system.

## 2023-08-09 ENCOUNTER — EMERGENCY (EMERGENCY)
Facility: HOSPITAL | Age: 43
LOS: 0 days | Discharge: HOME | End: 2023-08-09
Attending: STUDENT IN AN ORGANIZED HEALTH CARE EDUCATION/TRAINING PROGRAM
Payer: MEDICAID

## 2023-08-09 VITALS
DIASTOLIC BLOOD PRESSURE: 87 MMHG | HEIGHT: 61 IN | RESPIRATION RATE: 17 BRPM | OXYGEN SATURATION: 99 % | SYSTOLIC BLOOD PRESSURE: 146 MMHG | TEMPERATURE: 98 F | WEIGHT: 149.91 LBS | HEART RATE: 98 BPM

## 2023-08-09 DIAGNOSIS — F60.3 BORDERLINE PERSONALITY DISORDER: ICD-10-CM

## 2023-08-09 DIAGNOSIS — R10.9 UNSPECIFIED ABDOMINAL PAIN: ICD-10-CM

## 2023-08-09 DIAGNOSIS — F17.200 NICOTINE DEPENDENCE, UNSPECIFIED, UNCOMPLICATED: ICD-10-CM

## 2023-08-09 DIAGNOSIS — M54.9 DORSALGIA, UNSPECIFIED: ICD-10-CM

## 2023-08-09 DIAGNOSIS — Z86.59 PERSONAL HISTORY OF OTHER MENTAL AND BEHAVIORAL DISORDERS: ICD-10-CM

## 2023-08-09 DIAGNOSIS — F25.9 SCHIZOAFFECTIVE DISORDER, UNSPECIFIED: ICD-10-CM

## 2023-08-09 LAB
ALBUMIN SERPL ELPH-MCNC: 4.2 G/DL — SIGNIFICANT CHANGE UP (ref 3.5–5.2)
ALP SERPL-CCNC: 59 U/L — SIGNIFICANT CHANGE UP (ref 30–115)
ALT FLD-CCNC: 10 U/L — SIGNIFICANT CHANGE UP (ref 0–41)
ANION GAP SERPL CALC-SCNC: 11 MMOL/L — SIGNIFICANT CHANGE UP (ref 7–14)
AST SERPL-CCNC: 15 U/L — SIGNIFICANT CHANGE UP (ref 0–41)
BASOPHILS # BLD AUTO: 0.04 K/UL — SIGNIFICANT CHANGE UP (ref 0–0.2)
BASOPHILS NFR BLD AUTO: 0.5 % — SIGNIFICANT CHANGE UP (ref 0–1)
BILIRUB SERPL-MCNC: 0.5 MG/DL — SIGNIFICANT CHANGE UP (ref 0.2–1.2)
BUN SERPL-MCNC: 9 MG/DL — LOW (ref 10–20)
CALCIUM SERPL-MCNC: 8.9 MG/DL — SIGNIFICANT CHANGE UP (ref 8.4–10.5)
CHLORIDE SERPL-SCNC: 104 MMOL/L — SIGNIFICANT CHANGE UP (ref 98–110)
CO2 SERPL-SCNC: 25 MMOL/L — SIGNIFICANT CHANGE UP (ref 17–32)
CREAT SERPL-MCNC: 0.9 MG/DL — SIGNIFICANT CHANGE UP (ref 0.7–1.5)
EGFR: 81 ML/MIN/1.73M2 — SIGNIFICANT CHANGE UP
EOSINOPHIL # BLD AUTO: 0.09 K/UL — SIGNIFICANT CHANGE UP (ref 0–0.7)
EOSINOPHIL NFR BLD AUTO: 1 % — SIGNIFICANT CHANGE UP (ref 0–8)
GLUCOSE SERPL-MCNC: 85 MG/DL — SIGNIFICANT CHANGE UP (ref 70–99)
HCG SERPL QL: NEGATIVE — SIGNIFICANT CHANGE UP
HCT VFR BLD CALC: 35.7 % — LOW (ref 37–47)
HGB BLD-MCNC: 11.6 G/DL — LOW (ref 12–16)
IMM GRANULOCYTES NFR BLD AUTO: 0.2 % — SIGNIFICANT CHANGE UP (ref 0.1–0.3)
LYMPHOCYTES # BLD AUTO: 1.87 K/UL — SIGNIFICANT CHANGE UP (ref 1.2–3.4)
LYMPHOCYTES # BLD AUTO: 21.5 % — SIGNIFICANT CHANGE UP (ref 20.5–51.1)
MCHC RBC-ENTMCNC: 28.4 PG — SIGNIFICANT CHANGE UP (ref 27–31)
MCHC RBC-ENTMCNC: 32.5 G/DL — SIGNIFICANT CHANGE UP (ref 32–37)
MCV RBC AUTO: 87.5 FL — SIGNIFICANT CHANGE UP (ref 81–99)
MONOCYTES # BLD AUTO: 0.54 K/UL — SIGNIFICANT CHANGE UP (ref 0.1–0.6)
MONOCYTES NFR BLD AUTO: 6.2 % — SIGNIFICANT CHANGE UP (ref 1.7–9.3)
NEUTROPHILS # BLD AUTO: 6.15 K/UL — SIGNIFICANT CHANGE UP (ref 1.4–6.5)
NEUTROPHILS NFR BLD AUTO: 70.6 % — SIGNIFICANT CHANGE UP (ref 42.2–75.2)
NRBC # BLD: 0 /100 WBCS — SIGNIFICANT CHANGE UP (ref 0–0)
PLATELET # BLD AUTO: 221 K/UL — SIGNIFICANT CHANGE UP (ref 130–400)
PMV BLD: 12.1 FL — HIGH (ref 7.4–10.4)
POTASSIUM SERPL-MCNC: 3.2 MMOL/L — LOW (ref 3.5–5)
POTASSIUM SERPL-SCNC: 3.2 MMOL/L — LOW (ref 3.5–5)
PROT SERPL-MCNC: 7.1 G/DL — SIGNIFICANT CHANGE UP (ref 6–8)
RBC # BLD: 4.08 M/UL — LOW (ref 4.2–5.4)
RBC # FLD: 13.1 % — SIGNIFICANT CHANGE UP (ref 11.5–14.5)
SODIUM SERPL-SCNC: 140 MMOL/L — SIGNIFICANT CHANGE UP (ref 135–146)
WBC # BLD: 8.71 K/UL — SIGNIFICANT CHANGE UP (ref 4.8–10.8)
WBC # FLD AUTO: 8.71 K/UL — SIGNIFICANT CHANGE UP (ref 4.8–10.8)

## 2023-08-09 PROCEDURE — 96374 THER/PROPH/DIAG INJ IV PUSH: CPT | Mod: XU

## 2023-08-09 PROCEDURE — 36415 COLL VENOUS BLD VENIPUNCTURE: CPT

## 2023-08-09 PROCEDURE — 85025 COMPLETE CBC W/AUTO DIFF WBC: CPT

## 2023-08-09 PROCEDURE — 84703 CHORIONIC GONADOTROPIN ASSAY: CPT

## 2023-08-09 PROCEDURE — 96376 TX/PRO/DX INJ SAME DRUG ADON: CPT

## 2023-08-09 PROCEDURE — 74177 CT ABD & PELVIS W/CONTRAST: CPT | Mod: 26,MA

## 2023-08-09 PROCEDURE — 74177 CT ABD & PELVIS W/CONTRAST: CPT | Mod: MA

## 2023-08-09 PROCEDURE — 80053 COMPREHEN METABOLIC PANEL: CPT

## 2023-08-09 PROCEDURE — 99284 EMERGENCY DEPT VISIT MOD MDM: CPT | Mod: 25

## 2023-08-09 PROCEDURE — 99285 EMERGENCY DEPT VISIT HI MDM: CPT

## 2023-08-09 RX ORDER — METHOCARBAMOL 500 MG/1
2 TABLET, FILM COATED ORAL
Qty: 28 | Refills: 0
Start: 2023-08-09 | End: 2023-08-15

## 2023-08-09 RX ORDER — KETOROLAC TROMETHAMINE 30 MG/ML
15 SYRINGE (ML) INJECTION ONCE
Refills: 0 | Status: DISCONTINUED | OUTPATIENT
Start: 2023-08-09 | End: 2023-08-09

## 2023-08-09 RX ORDER — POTASSIUM CHLORIDE 20 MEQ
40 PACKET (EA) ORAL ONCE
Refills: 0 | Status: COMPLETED | OUTPATIENT
Start: 2023-08-09 | End: 2023-08-09

## 2023-08-09 RX ORDER — METHOCARBAMOL 500 MG/1
1500 TABLET, FILM COATED ORAL ONCE
Refills: 0 | Status: COMPLETED | OUTPATIENT
Start: 2023-08-09 | End: 2023-08-09

## 2023-08-09 RX ADMIN — Medication 40 MILLIEQUIVALENT(S): at 13:20

## 2023-08-09 RX ADMIN — Medication 15 MILLIGRAM(S): at 09:47

## 2023-08-09 RX ADMIN — Medication 15 MILLIGRAM(S): at 13:20

## 2023-08-09 RX ADMIN — METHOCARBAMOL 1500 MILLIGRAM(S): 500 TABLET, FILM COATED ORAL at 09:33

## 2023-08-09 NOTE — ED ADULT NURSE NOTE - NSFALLUNIVINTERV_ED_ALL_ED
Bed/Stretcher in lowest position, wheels locked, appropriate side rails in place/Call bell, personal items and telephone in reach/Instruct patient to call for assistance before getting out of bed/chair/stretcher/Non-slip footwear applied when patient is off stretcher/[place] to call system/Physically safe environment - no spills, clutter or unnecessary equipment/Purposeful proactive rounding/Room/bathroom lighting operational, light cord in reach

## 2023-08-09 NOTE — ED PROVIDER NOTE - PATIENT PORTAL LINK FT
You can access the FollowMyHealth Patient Portal offered by [facility] by registering at the following website: [email or web address]. By joining [employer]’s FollowMyHealth portal, you will also be able to view your health information using other applications (apps) compatible with our system.

## 2023-08-09 NOTE — ED PROVIDER NOTE - OBJECTIVE STATEMENT
pt presents to ED c/o L flank pain since yesterday. pain is sharp, nonradiating, moderate. denies exacerbating or relieving factors. Denies radiation pain/incontinence/numbness/saddle parathesia/legs weakness and difficulty on ambulation. denies fever/chill/HA/dizziness/chest pain/sob/abd pain/n/v/d/ urinary sxs

## 2023-08-09 NOTE — ED PROVIDER NOTE - NSFOLLOWUPINSTRUCTIONS_ED_ALL_ED_FT
Back Pain    Back pain is very common in adults. The cause of back pain is rarely dangerous and the pain often gets better over time. The cause of your back pain may not be known and may include strain of muscles or ligaments, degeneration of the spinal disks, or arthritis. Occasionally the pain may radiate down your leg(s). Over-the-counter medicines to reduce pain and inflammation are often the most helpful. Stretching and remaining active frequently helps the healing process.     SEEK IMMEDIATE MEDICAL CARE IF YOU HAVE THE FOLLOWING SYMPTOMS: bowel or bladder control problems, unusual weakness or numbness in your arms or legs, nausea or vomiting, abdominal pain, fever, dizziness/lightheadedness.    Ovarian Cyst    WHAT YOU NEED TO KNOW:    What is an ovarian cyst? An ovarian cyst is a sac that grows on an ovary. This sac usually contains fluid, but may sometimes have blood or tissue in it. Most ovarian cysts are harmless and go away without treatment in a few months. Some cysts can grow large, cause pain, or break open.    What causes an ovarian cyst? Most ovarian cysts form during or after ovulation. Right before ovulation, your ovary forms a follicle. A follicle is a fluid-filled blister that has an egg inside. During ovulation, the follicle breaks open and releases the egg. A cyst may form if the follicle does not break open to release the egg. A cyst may form if the follicle opens to release the egg, but then closes and collects fluid. Endometriosis can also cause a cyst to form. Endometriosis is a condition in which tissue from your uterus grows on your ovaries. Some of this tissue may develop into a cyst.    What are the signs and symptoms of an ovarian cyst? You may not feel anything or know that you have a cyst, or you may have any of the following:    Severe pain in your lower abdomen and pelvic area that may be sharp and sudden or feel like a dull ache      Fullness and swelling in your lower abdomen       Infertility (not able to get pregnant)      Late or painful periods      Small amounts of bleeding between your periods      Lower abdominal or pelvic pain during sex      Nausea or vomiting    How is an ovarian cyst diagnosed?     Blood tests may include hormone levels, tests for cancer, and a pregnancy test.       Ultrasound pictures may show a cyst on your ovary. For this test, an ultrasound wand is inserted into your vagina and guided up toward your uterus. This helps your healthcare provider get a closer look at your ovaries.     How is an ovarian cyst treated? Treatment will depend on your age, test results, and the kind of cyst you have. Your healthcare provider may wait to see if your ovarian cyst will go away without treatment. You may be given hormone medicine, such as birth control pills. This medicine may help to control your periods, shrink a cyst, and prevent new cysts from forming. You may need surgery to have the cyst removed.    Call 911 for any of the following:     You are too weak or dizzy to stand up.        When should I seek immediate care?     You have severe abdominal pain. The pain may be sharp and sudden.      You have a fever.    When should I contact my healthcare provider?     Your periods are early, late, or more painful than usual.      You have bleeding from your vagina that is not your period.      You have abdominal pain all the time.      Your abdomen is swollen.      You have feelings of fullness, pressure, or discomfort in your abdomen.      You have trouble urinating or emptying your bladder completely.      You have pain during intercourse.      You are losing weight without trying.      You have questions or concerns about your condition or care.    CARE AGREEMENT:    You have the right to help plan your care. Learn about your health condition and how it may be treated. Discuss treatment options with your healthcare providers to decide what care you want to receive. You always have the right to refuse treatment.

## 2023-08-09 NOTE — ED PROVIDER NOTE - PROGRESS NOTE DETAILS
pk: pt reports improvement of pain, signed out to me by VIDHI Mcneill, will dc with robaxin and pcp follow up. abd soft non tender no n/v tolerating PO

## 2023-08-09 NOTE — ED PROVIDER NOTE - CLINICAL SUMMARY MEDICAL DECISION MAKING FREE TEXT BOX
The patient is a 42 year old female with a history of borderline personality disorder, polysubstance abuse, schizoaffective disorder presenting for L back pain since yesterday. Pain is nonradiating. No associated numbness/tingling, urinary symptoms.   pt in NAD, AAOx3, head NC/AT, CN II-XII intact, PEERL, EOMi, neck (-) midline tenderness, lungs CTA B/L, CV S1S2 regular, abdomen soft/NT/ND/(+)BS, ext (-) edema, motor 5/5x4, sensation intact, ambulating with steady gait. +left CVA ttp.   Labs and ordered and reviewed.  Imaging was ordered and reviewed by me. Patient's records (prior hospital, ED visit, and/or nursing home notes if available) were reviewed.  Appropriate follow-up was arranged.

## 2023-08-09 NOTE — ED PROVIDER NOTE - NSFOLLOWUPCLINICS_GEN_ALL_ED_FT
[facility]  Medicine  [address], [place]   Phone: [phone]  Fax:   Follow Up Time: 1-3 Days    [facility]  OB/GYN  [address] [place] [address]  Phone: [phone]  Fax:   Follow Up Time: 1-3 Days

## 2023-09-03 NOTE — ED PROVIDER NOTE - NSICDXNOPASTSURGICALHX_GEN_ALL_ED
<-- Click to add NO significant Past Surgical History Principal Discharge DX:	Redness of left eye  Secondary Diagnosis:	Left eye pain   1

## 2024-10-25 NOTE — ED ADULT NURSE NOTE - SUICIDE SCREENING QUESTION 2
Unable to reach patient for discharge follow up call.   Left Message with call back number for patient to call if needed   If no voicemail available call attempts x 2 were made to contact the patient to assist with any questions or concerns patient may have.     No

## 2025-05-02 NOTE — ED ADULT NURSE NOTE - CAS TRG GENERAL AIRWAY, MLM
1st attempt - Left message for patient to call back.    Patient needs to reschedule her bumped Post-Partum appointment that was scheduled for today 5/2/25 with Dr Mcgovern in our OB/GYN department.  Ok to use work-in or permission slot, if needed.    
Pending appt 10/2017  Refilled per protocol  
Patent